# Patient Record
Sex: FEMALE | Race: ASIAN | NOT HISPANIC OR LATINO | Employment: FULL TIME | URBAN - METROPOLITAN AREA
[De-identification: names, ages, dates, MRNs, and addresses within clinical notes are randomized per-mention and may not be internally consistent; named-entity substitution may affect disease eponyms.]

---

## 2018-02-02 ENCOUNTER — OFFICE VISIT (OUTPATIENT)
Dept: URGENT CARE | Facility: CLINIC | Age: 33
End: 2018-02-02

## 2018-02-02 VITALS
DIASTOLIC BLOOD PRESSURE: 90 MMHG | HEIGHT: 64 IN | HEART RATE: 91 BPM | RESPIRATION RATE: 16 BRPM | OXYGEN SATURATION: 99 % | SYSTOLIC BLOOD PRESSURE: 118 MMHG | TEMPERATURE: 98 F | BODY MASS INDEX: 26.46 KG/M2 | WEIGHT: 155 LBS

## 2018-02-02 DIAGNOSIS — T78.40XA ALLERGIC REACTION, INITIAL ENCOUNTER: Primary | ICD-10-CM

## 2018-02-02 PROCEDURE — 99203 OFFICE O/P NEW LOW 30 MIN: CPT | Mod: 25,S$GLB,, | Performed by: NURSE PRACTITIONER

## 2018-02-02 PROCEDURE — 96372 THER/PROPH/DIAG INJ SC/IM: CPT | Mod: S$GLB,,, | Performed by: FAMILY MEDICINE

## 2018-02-02 PROCEDURE — 3008F BODY MASS INDEX DOCD: CPT | Mod: S$GLB,,, | Performed by: NURSE PRACTITIONER

## 2018-02-02 PROCEDURE — 96372 THER/PROPH/DIAG INJ SC/IM: CPT | Mod: S$GLB,,, | Performed by: NURSE PRACTITIONER

## 2018-02-02 RX ORDER — DEXTROAMPHETAMINE SACCHARATE, AMPHETAMINE ASPARTATE MONOHYDRATE, DEXTROAMPHETAMINE SULFATE AND AMPHETAMINE SULFATE 3.75; 3.75; 3.75; 3.75 MG/1; MG/1; MG/1; MG/1
15 CAPSULE, EXTENDED RELEASE ORAL EVERY MORNING
COMMUNITY
End: 2022-05-25 | Stop reason: SDUPTHER

## 2018-02-02 RX ORDER — METHYLPREDNISOLONE 4 MG/1
4 TABLET ORAL DAILY
Qty: 21 TABLET | Refills: 0 | Status: SHIPPED | OUTPATIENT
Start: 2018-02-02 | End: 2018-02-23

## 2018-02-02 RX ORDER — HYDROXYZINE HYDROCHLORIDE 25 MG/1
25 TABLET, FILM COATED ORAL 4 TIMES DAILY PRN
Qty: 28 TABLET | Refills: 0 | Status: SHIPPED | OUTPATIENT
Start: 2018-02-02 | End: 2018-02-09

## 2018-02-02 RX ORDER — EPINEPHRINE 0.3 MG/.3ML
1 INJECTION SUBCUTANEOUS ONCE AS NEEDED
Qty: 2 EACH | Refills: 0 | Status: SHIPPED | OUTPATIENT
Start: 2018-02-02 | End: 2022-07-01

## 2018-02-02 RX ORDER — PREDNISONE 20 MG/1
60 TABLET ORAL ONCE
Qty: 3 TABLET | Refills: 0 | Status: CANCELLED | OUTPATIENT
Start: 2018-02-02 | End: 2018-02-02

## 2018-02-02 RX ORDER — EPINEPHRINE 0.1 MG/ML
0.3 INJECTION INTRAVENOUS
Status: COMPLETED | OUTPATIENT
Start: 2018-02-02 | End: 2018-02-02

## 2018-02-02 RX ORDER — PREDNISONE 20 MG/1
60 TABLET ORAL
Status: COMPLETED | OUTPATIENT
Start: 2018-02-02 | End: 2018-02-02

## 2018-02-02 RX ORDER — DIAZEPAM 10 MG/1
10 TABLET ORAL
COMMUNITY
End: 2022-05-25 | Stop reason: SDUPTHER

## 2018-02-02 RX ADMIN — PREDNISONE 60 MG: 20 TABLET ORAL at 12:02

## 2018-02-02 RX ADMIN — EPINEPHRINE 0.3 MG: 0.1 INJECTION INTRAVENOUS at 01:02

## 2018-02-02 NOTE — PROGRESS NOTES
"Subjective:       Patient ID: Eleanor Aguero is a 32 y.o. female.    Vitals:  height is 5' 4" (1.626 m) and weight is 70.3 kg (155 lb). Her temperature is 98.2 °F (36.8 °C). Her blood pressure is 118/90 (abnormal) and her pulse is 91. Her respiration is 16 and oxygen saturation is 99%.     Chief Complaint: Allergic Reaction (Tuesday)    Pt ate oysters and peanuts on Monday. Pt noticed hives on Tuesday morning so she started taking benadryl as needed. Pt took zyrtec at 10am this morning. Pt says she also must be allergic to zyrtec because her lips started swelling after taking the zyrtec about 15 minutes later. No SOB or throat swelling. Pt says the hives is worse on her thighs. The hives is all over her body. The hives started traveling up to her face this morning.       Allergic Reaction   This is a new problem. The current episode started 3 to 5 days ago. The problem has been gradually worsening since onset. The problem is moderate. Associated symptoms include itching and a rash.     Review of Systems   Constitution: Negative for chills and fever.   HENT: Negative for sore throat.    Respiratory: Negative for shortness of breath.    Skin: Positive for itching and rash.   Musculoskeletal: Negative for joint pain.       Objective:      Physical Exam   Constitutional: She is oriented to person, place, and time. She appears well-developed and well-nourished. She is cooperative.  Non-toxic appearance. She does not appear ill. No distress.       HENT:   Head: Normocephalic and atraumatic.   Right Ear: Hearing, tympanic membrane, external ear and ear canal normal.   Left Ear: Hearing, tympanic membrane, external ear and ear canal normal.   Nose: Nose normal. No mucosal edema, rhinorrhea or nasal deformity. No epistaxis. Right sinus exhibits no maxillary sinus tenderness and no frontal sinus tenderness. Left sinus exhibits no maxillary sinus tenderness and no frontal sinus tenderness.   Mouth/Throat: Uvula is midline, " oropharynx is clear and moist and mucous membranes are normal. No trismus in the jaw. Normal dentition. No uvula swelling. No posterior oropharyngeal erythema.       Eyes: Conjunctivae and lids are normal. Right eye exhibits no discharge. Left eye exhibits no discharge. No scleral icterus.   Sclera clear bilat   Neck: Trachea normal, normal range of motion, full passive range of motion without pain and phonation normal. Neck supple.   Cardiovascular: Normal rate, regular rhythm, normal heart sounds, intact distal pulses and normal pulses.    Pulmonary/Chest: Effort normal and breath sounds normal. No respiratory distress.   Abdominal: Soft. Normal appearance and bowel sounds are normal. She exhibits no distension, no pulsatile midline mass and no mass. There is no tenderness.   Musculoskeletal: Normal range of motion. She exhibits no edema or deformity.   Neurological: She is alert and oriented to person, place, and time. She exhibits normal muscle tone. Coordination normal.   Skin: Skin is warm, dry and intact. Rash noted. Rash is urticarial (hives noted to upper and lower exteremeties upper lip is also swollen.  She denies any  issues with throat swelling or itching, or SOB or Wheezing). She is not diaphoretic. No pallor.   Rash is not totally resolved, but is improving.  Patient is ready to be discharged.     Psychiatric: She has a normal mood and affect. Her speech is normal and behavior is normal. Judgment and thought content normal. Cognition and memory are normal.   Nursing note and vitals reviewed.      Assessment:       1. Allergic reaction, initial encounter        Plan:         Allergic reaction, initial encounter  -     ranitidine tablet 300 mg; Take 2 tablets (300 mg total) by mouth one time.  -     predniSONE tablet 60 mg; Take 3 tablets (60 mg total) by mouth one time.  -     Ambulatory referral to Allergy    Other orders  -     Cancel: ranitidine (ZANTAC) 150 MG tablet; Take 2 tablets (300 mg total)  by mouth once.  Dispense: 2 tablet; Refill: 1  -     Cancel: predniSONE (DELTASONE) 20 MG tablet; Take 3 tablets (60 mg total) by mouth once.  Dispense: 3 tablet; Refill: 0  -     EPINEPHrine 0.1 mg/mL injection 0.3 mg; Inject 3 mLs (0.3 mg total) into the skin one time.  -     EPINEPHrine (EPIPEN) 0.3 mg/0.3 mL AtIn; Inject 0.3 mLs (0.3 mg total) into the muscle once as needed (for severe allergic reaction).  Dispense: 2 each; Refill: 0  -     methylPREDNISolone (MEDROL) 4 MG Tab; Take 1 tablet (4 mg total) by mouth once daily. 24 mg (6 pills) PO on day 1, then decrease by 4mg/day x 5 days per dose pack instructions.  Dispense: 21 tablet; Refill: 0  -     hydrOXYzine HCl (ATARAX) 25 MG tablet; Take 1 tablet (25 mg total) by mouth 4 (four) times daily as needed for Itching.  Dispense: 28 tablet; Refill: 0

## 2018-02-02 NOTE — PATIENT INSTRUCTIONS
Local Allergic Reaction, Other  You are having an allergic reaction. Almost anything can cause one. Different people are allergic to different things. It is usually something that you ate or swallowed, came into contact with by getting or putting it on your skin or clothes, or something you breathed in the air. This can be very annoying and sometimes scary.  Symptoms of an allergic reaction can include:  · Rash, hives, redness, welts, blisters  · Itching, burning, stinging, pain  · Dry, flaky, cracking, scaly skin  · Swelling of the face, lips or other parts of the body  Sometimes the cause of an allergic reaction may be obvious. To help identify your allergen, remember:  · When it started  · What you were doing at the time or just before that  · Any activities you were involved in  · Any new products or contacts  Here are some common causes, but remember almost anything can cause a reaction, and you may not even be aware that you came into contact with one of these things.  · Dust, mold, pollen  · Plants such as poison ivy and poison oak are common ones, but there are many others  · Animals  · Foods such as shrimp, shellfish, peanuts, milk products, gluten, eggs; also colorings, flavorings, additives  · Insect bites or stings such as bees, mosquitos, flees, ticks  · Medicines such as penicillin, sulfa drugs, amoxicillin, aspirin, ibuprofen; any medicine can cause a reaction  · Jewelry such as nickel, gold  (new, or something youve worn for a while including zippers, and  buttons)  · Latex such as in gloves, clothes, toys, balloons, or some tapes (some people allergic to latex may also have problems with foods like bananas, avocados, kiwi, papaya, or chestnuts)  · Lotions, perfumes, cosmetics, soaps, shampoos, skincare products, nail products  · Chemicals or dyes in clothing, linen, , hair dyes, soaps, iodine  Home care    The goal of our treatment is to help relieve the symptoms, and get you feeling  better. The rash will usually fade over several days, but can sometimes last a couple of weeks. Over the next couple of days, there may be times when it is gets a little worse, and then better again. Here are some things to do:  · If you know what you are allergic to, avoid it because future reactions could be worse than this one.  · Avoid tight clothing and anything that heats up your skin (hot showers/baths, direct sunlight) since heat will make itching worse.  · An ice pack will relieve local areas of intense itching and redness. Dont put the ice directly on the skin, because it can damage the skin. You can also ice put it in a plastic bag. Wrap it in something like a towel, luis shirt, or cloth.  · Oral Benadryl (diphenhydramine) is an antihistamine available at drug and grocery stores. Unless a prescription antihistamine was given, Benadryl may be used to reduce itching if large areas of the skin are involved. It may make you sleepy, so be careful using it in the daytime or when going to school, working, or driving. [NOTE: Do not use Benadryl if you have glaucoma or if you are a man with trouble urinating due to an enlarged prostate.] There are antihistamines that causes less drowsiness and is a good alternatives for daytime use. Ask your pharmacist for suggestions.  · Do not use Benadryl cream on your skin, because in some people it can cause a further reaction, and make you allergic to Benadryl.  · Try not to scratch. This can tear the skin and cause an infection.  · Using heat-steam to clean your home, using high-efficiency particulate (HEPA) vacuums and filters, avoiding food and pet triggers, exterminating cockroaches, and frequent house cleaning are a few of the strategies used to decrease allergic reactions.  Follow-up care  Follow up with your healthcare provider, or as advised if your symptoms do not continue to improve or get worse.  Call 911  Call 911 if any of these occur:  · Trouble breathing or  swallowing, wheezing  · New or worsening swelling in the mouth, throat, or tongue  · Hoarse voice or trouble speaking  · Confused   · Very drowsy or trouble awakening  · Fainting or loss of consciousness  · Rapid heart rate  · Low blood pressure  · Feeling of doom  · Nausea, vomiting, abdominal pain, diarrhea  · Vomiting blood, or large amounts of blood in stool  · Seizure  When to seek medical advice  Call your healthcare provider right away if any of the following occur:  · Spreading areas of itching, redness or swelling  · New or worse swelling in the face, eyelids, or  lips  · Dizziness, weakness  · Signs of infection:  ¨ Spreading redness  ¨ Increased pain or swelling  ¨ Fever of 100.4ºF (38ºC) or higher, or as directed by your healthcare provider  ¨ Colored fluid draining from the inflamed areas  Date Last Reviewed: 7/30/2015  © 6576-2393 [employer], [facility]. [address] [place] [address]. All rights reserved. This information is not intended as a substitute for professional medical care. Always follow your healthcare professional's instructions.

## 2018-02-05 ENCOUNTER — CLINICAL SUPPORT (OUTPATIENT)
Dept: URGENT CARE | Facility: CLINIC | Age: 33
End: 2018-02-05

## 2018-02-05 ENCOUNTER — TELEPHONE (OUTPATIENT)
Dept: URGENT CARE | Facility: CLINIC | Age: 33
End: 2018-02-05

## 2018-02-05 VITALS
OXYGEN SATURATION: 99 % | TEMPERATURE: 98 F | HEIGHT: 64 IN | WEIGHT: 155 LBS | BODY MASS INDEX: 26.46 KG/M2 | RESPIRATION RATE: 16 BRPM | DIASTOLIC BLOOD PRESSURE: 78 MMHG | SYSTOLIC BLOOD PRESSURE: 102 MMHG | HEART RATE: 75 BPM

## 2018-02-05 DIAGNOSIS — T78.40XD ALLERGIC REACTION, SUBSEQUENT ENCOUNTER: Primary | ICD-10-CM

## 2018-02-05 PROCEDURE — 99213 OFFICE O/P EST LOW 20 MIN: CPT | Mod: S$GLB,,, | Performed by: NURSE PRACTITIONER

## 2018-02-05 RX ORDER — PREDNISONE 20 MG/1
20 TABLET ORAL 2 TIMES DAILY
Qty: 10 TABLET | Refills: 0 | Status: SHIPPED | OUTPATIENT
Start: 2018-02-05 | End: 2018-02-10

## 2018-02-05 RX ORDER — HYDROXYZINE PAMOATE 25 MG/1
25 CAPSULE ORAL
Status: COMPLETED | OUTPATIENT
Start: 2018-02-05 | End: 2018-02-05

## 2018-02-05 RX ORDER — PREDNISONE 20 MG/1
60 TABLET ORAL
Status: COMPLETED | OUTPATIENT
Start: 2018-02-05 | End: 2018-02-05

## 2018-02-05 RX ORDER — HYDROXYZINE PAMOATE 25 MG/1
25 CAPSULE ORAL EVERY 8 HOURS PRN
Qty: 21 CAPSULE | Refills: 0 | Status: SHIPPED | OUTPATIENT
Start: 2018-02-05 | End: 2022-07-01

## 2018-02-05 RX ADMIN — PREDNISONE 60 MG: 20 TABLET ORAL at 12:02

## 2018-02-05 RX ADMIN — HYDROXYZINE PAMOATE 25 MG: 25 CAPSULE ORAL at 12:02

## 2018-02-05 NOTE — LETTER
February 5, 2018      [facility] [place]  [address] [address]  Phone: [phone]  Fax: [phone]       Patient: Eleanor Aguero   YOB: 1985  Date of Visit: 02/05/2018    To Whom It May Concern:    Gabriela Aguero  was at [facility] on 02/05/2018. She may return to work/school on 2/8/18 with no restrictions. If you have any questions or concerns, or if I can be of further assistance, please do not hesitate to contact me.    Sincerely,    GIA Watts

## 2018-02-05 NOTE — PROGRESS NOTES
"Subjective:       Patient ID: Eleanor Aguero is a 32 y.o. female.    Vitals:  height is 5' 4" (1.626 m) and weight is 70.3 kg (155 lb). Her temperature is 98 °F (36.7 °C). Her blood pressure is 102/78 and her pulse is 75. Her respiration is 16 and oxygen saturation is 99%.     Chief Complaint: Rash    Pt in for allergic rxn that she came in for a week ago. The rash did go away and then started coming back about an hour ago. Denies trouble breathing. Denies throat swelling. Pt lips and tongue do not feel tingly. Pt has only been taking the steroid that she was prescribed, denies taking anything else. The rash is everywhere, all over her arms and legs.       Rash   This is a recurrent problem. The current episode started in the past 7 days. The problem has been waxing and waning since onset. Pertinent negatives include no congestion, fever, joint pain, shortness of breath or sore throat.     Review of Systems   Constitution: Negative for chills and fever.   HENT: Negative for congestion and sore throat.    Respiratory: Negative for shortness of breath.    Skin: Positive for color change, itching and rash.   Musculoskeletal: Negative for joint pain.       Objective:      Physical Exam   Constitutional: She is oriented to person, place, and time. She appears well-developed and well-nourished.   HENT:   Head: Normocephalic and atraumatic. Head is without abrasion, without contusion and without laceration.   Right Ear: External ear normal.   Left Ear: External ear normal.   Nose: Nose normal.   Mouth/Throat: Oropharynx is clear and moist.   Eyes: Conjunctivae, EOM and lids are normal. Pupils are equal, round, and reactive to light.   Neck: Trachea normal, full passive range of motion without pain and phonation normal. Neck supple.   Cardiovascular: Normal rate, regular rhythm and normal heart sounds.    Pulmonary/Chest: Effort normal and breath sounds normal. No stridor. No respiratory distress.   Musculoskeletal: " Normal range of motion.   Neurological: She is alert and oriented to person, place, and time.   Skin: Skin is warm, dry and intact. Capillary refill takes less than 2 seconds. No abrasion, no bruising, no burn, no ecchymosis, no laceration, no lesion and no rash noted. No erythema.   Urticarial rash noted to bue and ble, pruritic, no complaints of sob or wheezing, chest clear   Psychiatric: She has a normal mood and affect. Her speech is normal and behavior is normal. Judgment and thought content normal. Cognition and memory are normal.   Nursing note and vitals reviewed.      Assessment:       1. Allergic reaction, subsequent encounter        Plan:         Allergic reaction, subsequent encounter    Other orders  -     predniSONE tablet 60 mg; Take 3 tablets (60 mg total) by mouth one time.  -     hydrOXYzine pamoate capsule 25 mg; Take 1 capsule (25 mg total) by mouth one time.  -     ranitidine (ZANTAC) 150 MG tablet; Take 1 tablet (150 mg total) by mouth 2 (two) times daily.  Dispense: 60 tablet; Refill: 1        pt was improved until this am, when the hives returned, she was taking medrol dose pack, but had not taken any antihistamine since Saturday. Will restart antihistamines and increase steroids and follow up with allergist or report to ED if symptoms worsen. No airway involvement or sob.

## 2018-02-05 NOTE — PATIENT INSTRUCTIONS
General Allergic Reactions  An allergic reaction is a set of symptoms caused by an allergen. An allergen is something that causes a persons immune system to react. When a person comes in contact with an allergen, it causes the body to release chemicals. These include the chemical histamine. Histamine causes swelling and itching. It may affect the entire body. This is called a general allergic reaction. Often symptoms affect only 1 part of the body. This is called a local allergic reaction.  You are having an allergic reaction. Almost anything can cause one. Different people are allergic to different things. It is usually something that you ate or swallowed, came into contact with by getting or putting it on your skin or clothes, or something you breathed in the air. This can be very annoying and sometimes scary.  Most of us think of allergic reactions when we have a rash or itchy skin. Symptoms can include:  · Itching of the eyes, nose, and roof of the mouth  · Runny or stuffy nose  · Watery eyes   · Sneezing or coughing   · A blocked feeling in the ear  · Red, itchy rash called hives  · Red and purple spots  · Rash, redness, welts, blisters  · Itching, burning, stinging, pain  · Dry, flaky, cracking, scaly skin  Severe symptoms include:  · Swelling of the face, lips, or other parts of the body  · Hoarse voice  · Trouble swallowing, feeling like your throat is closing  · Trouble breathing, wheezing  · Nausea, vomiting, diarrhea, stomach cramps  · Feeling faint or lightheaded, rapid heart rate  Sometimes the cause may be obvious. But there are so many things that can cause a reaction that you may not be able to figure out. The most important things to help find your allergen are:  · Remembering when it started  · What you were doing at the time or just before that  · Any activities you were involved in  · Any new products or contacts  Below are some common causes. But remember that almost anything can cause a  reaction. You may not even be aware that you came into contact with one of these things:  · Dust, mold, pollen  · Plants (common ones are poison ivy and poison oak, but there are many others)   · Animals  · Foods such as shrimp, shellfish, peanuts, milk products, gluten, and eggs. Also food colorings, flavorings, and additives.  · Insect bites or stings such as bees, mosquitos, fleas, ticks  · Medicines such as penicillin, sulfa medicines, amoxicillin, aspirin, and ibuprofen. But any medicine can cause a reaction.  · Jewelry such as nickel or gold. This can be new, or something youve worn for a while, including zippers and buttons.  · Latex such as in gloves, clothes, toys, balloons, or some tapes. Some people allergic to latex may also have problems with foods like bananas, avocados, kiwi, papaya, or chestnuts.  · Lotions, perfumes, cosmetics, soaps, shampoos, skincare products, nail products  · Chemicals or dyes in clothing, linen, , hair dyes, soaps, iodine  Many viruses and common colds can cause a rash that is not an allergic reaction. Sometimes it is hard to tell the difference between allergies, sensitivity, or an intolerance to something. This is especially true with food. Many things can cause diarrhea, vomiting, stomach cramps, and skin irritation.  Home care    The goal of treatment is to help relieve the symptoms and get you feeling better. The rash will usually fade over several days. But it can sometimes last a couple of weeks. Over the next couple of days, there may be times when it is gets a little worse, and then better again. Here are some things to do:  · If you know what you are allergic to, stay away from it. Future reactions could be worse than this one.  · Avoid tight clothing and anything that heats up your skin (hot showers or baths, direct sunlight). Heat will make itching worse.  · An ice pack will relieve local areas of intense itching and redness. To make an ice pack, put ice  cubes in a plastic bag that seals at the top. Wrap it in a thin, clean towel. Dont put the ice directly on the skin because it can damage the skin.  · Oral diphenhydramine is an over-the-counter antihistamine sold at pharmacy and grocery stores. Unless a prescription antihistamine was given, diphenhydramine may be used to reduce itching if large areas of the skin are involved. It may make you sleepy. So be careful using it in the daytime or when going to school, working, or driving. Note: Dont use diphenhydramine if you have glaucoma or if you are a man with trouble urinating due to an enlarged prostate. There are other antihistamines that wont make you so sleepy. These are good choices for daytime use. Ask your pharmacist for suggestions.  · Dont use diphenhydramine cream on your skin. It can cause a further reaction in some people.  · To help prevent an infection, don't scratch the affected area. Scratching may worsen the reaction and damage your skin. It can also lead to an infection. Always check the affected for signs of an infection.  · Call your healthcare provider and ask what you can use to help decrease the itching.  · To decrease allergic reactions, try the following:    · Use heat-steam to clean your home  · Use high-efficiency particulate (HEPA) vacuums and filters  · Stay away from food and pet triggers  · Kill any cockroaches  · Clean your house often  Follow-up care  Follow up with your healthcare provider, or as advised. If you had a severe reaction today, or if you have had several mild to medium allergic reactions in the past, ask your provider about allergy testing. This can help you find out what you are allergic to. If your reaction included dizziness, fainting, or trouble breathing or swallowing, ask your provider about carrying auto-injectable epinephrine.  Call 911  Call 911 if any of these occur:  · Trouble breathing or swallowing, wheezing  · Cool, moist, pale skin  · Shortness of  breath  · Hoarse voice or trouble speaking  · Confused   · Very drowsy or trouble awakening  · Fainting or loss of consciousness  · Rapid heart rate  · Feeling of dizziness or weakness or a sudden drop in blood pressure  · Feeling of doom  · Feeling lightheaded  · Severe nausea or vomiting, or diarrhea  · Seizure  · Swelling in the face, eyelids, lips, mouth, throat or tongue  · Drooling  When to seek medical advice  Call your healthcare provider right away if any of these occur:  · Spreading areas of itching, redness or swelling  · Nausea or stomach cramps or abdominal pain  · Continuing or recurring symptoms  · Spreading areas of redness, swelling, or itching  · Signs of infection at the affected site:  ¨ Spreading redness  ¨ Increased pain or swelling  ¨ Fluid or colored drainage from the site  ¨ Fever of 100.4°F (38°C) or above lasting for 24 to 48 hours, or as directed by your provider  Date Last Reviewed: 3/1/2017  © 6287-5158 [employer]. [address] [place] [address], [place] [address]. All rights reserved. This information is not intended as a substitute for professional medical care. Always follow your healthcare professional's instructions.      YOU HAVE AN APPT WITH Dr. MOLINA AT [facility] AT 9:15 Thursday Am. IF YOUR SYMPTOMS WORSEN PRIOR TO THAT, REPORT TO [place] OR [facility].  [address] [facility], [place] PHONE NUMBER [phone]

## 2019-07-19 ENCOUNTER — HISTORICAL (OUTPATIENT)
Dept: ADMINISTRATIVE | Facility: HOSPITAL | Age: 34
End: 2019-07-19

## 2019-07-19 LAB
ALBUMIN SERPL-MCNC: 4.7 G/DL (ref 3.5–5.5)
ALBUMIN/GLOB SERPL: 1.6 {RATIO} (ref 1.2–2.2)
ALP SERPL-CCNC: 66 IU/L (ref 39–117)
ALT SERPL-CCNC: 21 IU/L (ref 0–32)
AST SERPL-CCNC: 20 IU/L (ref 0–40)
BASOPHILS # BLD AUTO: 0 X10E3/UL (ref 0–0.2)
BASOPHILS NFR BLD AUTO: 0 %
BILIRUB SERPL-MCNC: 0.2 MG/DL (ref 0–1.2)
BUN SERPL-MCNC: 15 MG/DL (ref 6–20)
CALCIUM SERPL-MCNC: 9.6 MG/DL (ref 8.7–10.2)
CHLORIDE SERPL-SCNC: 100 MMOL/L (ref 96–106)
CHOLEST SERPL-MCNC: 181 MG/DL (ref 100–199)
CHOLEST/HDLC SERPL: 4.2 RATIO (ref 0–4.4)
CO2 SERPL-SCNC: 23 MMOL/L (ref 20–29)
CREAT SERPL-MCNC: 0.71 MG/DL (ref 0.57–1)
CREAT/UREA NIT SERPL: 21 (ref 9–23)
DEPRECATED CALCIDIOL+CALCIFEROL SERPL-MC: 17.2 NG/ML (ref 30–100)
EOSINOPHIL # BLD AUTO: 0.1 X10E3/UL (ref 0–0.4)
EOSINOPHIL NFR BLD AUTO: 2 %
ERYTHROCYTE [DISTWIDTH] IN BLOOD BY AUTOMATED COUNT: 13.2 % (ref 12.3–15.4)
GLOBULIN SER-MCNC: 2.9 G/DL (ref 1.5–4.5)
GLUCOSE SERPL-MCNC: 118 MG/DL (ref 65–99)
HBA1C MFR BLD: 5.9 % (ref 4.8–5.6)
HCT VFR BLD AUTO: 44.5 % (ref 34–46.6)
HDLC SERPL-MCNC: 43 MG/DL
HGB BLD-MCNC: 14.2 G/DL (ref 11.1–15.9)
LDLC SERPL CALC-MCNC: 70 MG/DL (ref 0–99)
LYMPHOCYTES # BLD AUTO: 2.5 X10E3/UL (ref 0.7–3.1)
LYMPHOCYTES NFR BLD AUTO: 33 %
MCH RBC QN AUTO: 31.6 PG (ref 26.6–33)
MCHC RBC AUTO-ENTMCNC: 31.9 G/DL (ref 31.5–35.7)
MCV RBC AUTO: 99 FL (ref 79–97)
MONOCYTES # BLD AUTO: 0.4 X10E3/UL (ref 0.1–0.9)
MONOCYTES NFR BLD AUTO: 5 %
NEUTROPHILS # BLD AUTO: 4.5 X10E3/UL (ref 1.4–7)
NEUTROPHILS NFR BLD AUTO: 60 %
PLATELET # BLD AUTO: 231 X10E3/UL (ref 150–450)
POTASSIUM SERPL-SCNC: 4.3 MMOL/L (ref 3.5–5.2)
PROT SERPL-MCNC: 7.6 G/DL (ref 6–8.5)
RBC # BLD AUTO: 4.5 X10(6)/MCL (ref 3.77–5.28)
SODIUM SERPL-SCNC: 139 MMOL/L (ref 134–144)
TRIGL SERPL-MCNC: 341 MG/DL (ref 0–149)
TSH SERPL-ACNC: 3.98 MIU/ML (ref 0.45–4.5)
VLDLC SERPL CALC-MCNC: 68 MG/DL (ref 5–40)
WBC # SPEC AUTO: 7.5 X10E3/UL (ref 3.4–10.8)

## 2019-08-05 LAB
PAP RECOMMENDATION EXT: NORMAL
PAP SMEAR: NORMAL

## 2020-08-17 ENCOUNTER — HISTORICAL (OUTPATIENT)
Dept: ADMINISTRATIVE | Facility: HOSPITAL | Age: 35
End: 2020-08-17

## 2020-08-17 LAB
ALBUMIN SERPL-MCNC: 4.7 G/DL (ref 3.8–4.8)
ALBUMIN/GLOB SERPL: 2.1 {RATIO} (ref 1.2–2.2)
ALP SERPL-CCNC: 58 IU/L (ref 39–117)
ALT SERPL-CCNC: 28 IU/L (ref 0–32)
AST SERPL-CCNC: 19 IU/L (ref 0–40)
BASOPHILS # BLD AUTO: 0 X10E3/UL (ref 0–0.2)
BASOPHILS NFR BLD AUTO: 0 %
BILIRUB SERPL-MCNC: 0.2 MG/DL (ref 0–1.2)
BUN SERPL-MCNC: 11 MG/DL (ref 6–20)
CALCIUM SERPL-MCNC: 9.5 MG/DL (ref 8.7–10.2)
CHLORIDE SERPL-SCNC: 102 MMOL/L (ref 96–106)
CHOLEST SERPL-MCNC: 171 MG/DL (ref 100–199)
CHOLEST/HDLC SERPL: 4.5 RATIO (ref 0–4.4)
CO2 SERPL-SCNC: 22 MMOL/L (ref 20–29)
CREAT SERPL-MCNC: 0.86 MG/DL (ref 0.57–1)
CREAT/UREA NIT SERPL: 13 (ref 9–23)
DEPRECATED CALCIDIOL+CALCIFEROL SERPL-MC: 16.9 NG/ML (ref 30–100)
EOSINOPHIL # BLD AUTO: 0.1 X10E3/UL (ref 0–0.4)
EOSINOPHIL NFR BLD AUTO: 2 %
ERYTHROCYTE [DISTWIDTH] IN BLOOD BY AUTOMATED COUNT: 12.7 % (ref 11.7–15.4)
GLOBULIN SER-MCNC: 2.2 G/DL (ref 1.5–4.5)
GLUCOSE SERPL-MCNC: 168 MG/DL (ref 65–99)
HBA1C MFR BLD: 6.4 % (ref 4.8–5.6)
HCT VFR BLD AUTO: 39.5 % (ref 34–46.6)
HDLC SERPL-MCNC: 38 MG/DL
HGB BLD-MCNC: 12.5 G/DL (ref 11.1–15.9)
LDLC SERPL CALC-MCNC: 89 MG/DL (ref 0–99)
LYMPHOCYTES # BLD AUTO: 2.5 X10E3/UL (ref 0.7–3.1)
LYMPHOCYTES NFR BLD AUTO: 35 %
MCH RBC QN AUTO: 30.6 PG (ref 26.6–33)
MCHC RBC AUTO-ENTMCNC: 31.6 G/DL (ref 31.5–35.7)
MCV RBC AUTO: 97 FL (ref 79–97)
MONOCYTES # BLD AUTO: 0.4 X10E3/UL (ref 0.1–0.9)
MONOCYTES NFR BLD AUTO: 5 %
NEUTROPHILS # BLD AUTO: 4.1 X10E3/UL (ref 1.4–7)
NEUTROPHILS NFR BLD AUTO: 58 %
PLATELET # BLD AUTO: 254 X10E3/UL (ref 150–450)
POTASSIUM SERPL-SCNC: 4 MMOL/L (ref 3.5–5.2)
PROT SERPL-MCNC: 6.9 G/DL (ref 6–8.5)
RBC # BLD AUTO: 4.08 X10(6)/MCL (ref 3.77–5.28)
SODIUM SERPL-SCNC: 141 MMOL/L (ref 134–144)
TRIGL SERPL-MCNC: 220 MG/DL (ref 0–149)
TSH SERPL-ACNC: 2.43 MIU/ML (ref 0.45–4.5)
VLDLC SERPL CALC-MCNC: 44 MG/DL (ref 5–40)
WBC # SPEC AUTO: 7.1 X10E3/UL (ref 3.4–10.8)

## 2022-03-11 ENCOUNTER — HISTORICAL (OUTPATIENT)
Dept: ADMINISTRATIVE | Facility: HOSPITAL | Age: 37
End: 2022-03-11

## 2022-03-11 LAB
ABS NEUT (OLG): 3.68 (ref 2.1–9.2)
ALBUMIN SERPL-MCNC: 3.9 G/DL (ref 3.5–5)
ALBUMIN/GLOB SERPL: 1.1 {RATIO} (ref 1.1–2)
ALP SERPL-CCNC: 54 U/L (ref 40–150)
ALT SERPL-CCNC: 30 U/L (ref 0–55)
APPEARANCE, UA: CLEAR
AST SERPL-CCNC: 20 U/L (ref 5–34)
BACTERIA SPEC CULT: NORMAL
BASOPHILS # BLD AUTO: 0 10*3/UL (ref 0–0.2)
BASOPHILS NFR BLD AUTO: 1 %
BILIRUB SERPL-MCNC: 0.3 MG/DL
BILIRUB UR QL STRIP: NEGATIVE
BILIRUBIN DIRECT+TOT PNL SERPL-MCNC: 0.1 (ref 0–0.5)
BILIRUBIN DIRECT+TOT PNL SERPL-MCNC: 0.2 (ref 0–0.8)
BUDDING YEAST: NORMAL
BUN SERPL-MCNC: 13.6 MG/DL (ref 7–18.7)
CALCIUM SERPL-MCNC: 9.1 MG/DL (ref 8.7–10.5)
CASTS, UA: NORMAL
CHLORIDE SERPL-SCNC: 104 MMOL/L (ref 98–107)
CHOLEST SERPL-MCNC: 203 MG/DL
CHOLEST/HDLC SERPL: 4 {RATIO} (ref 0–5)
CO2 SERPL-SCNC: 25 MMOL/L (ref 22–29)
COLOR UR: YELLOW
CREAT SERPL-MCNC: 0.73 MG/DL (ref 0.55–1.02)
CRYSTALS: NORMAL
DEPRECATED CALCIDIOL+CALCIFEROL SERPL-MC: 10.5 NG/ML (ref 30–80)
EOSINOPHIL # BLD AUTO: 0.1 10*3/UL (ref 0–0.9)
EOSINOPHIL NFR BLD AUTO: 2 %
ERYTHROCYTE [DISTWIDTH] IN BLOOD BY AUTOMATED COUNT: 13 % (ref 11.5–17)
EST. AVERAGE GLUCOSE BLD GHB EST-MCNC: 122.6 MG/DL
GLOBULIN SER-MCNC: 3.5 G/DL (ref 2.4–3.5)
GLUCOSE (UA): NEGATIVE
GLUCOSE SERPL-MCNC: 115 MG/DL (ref 74–100)
HBA1C MFR BLD: 5.9 %
HCT VFR BLD AUTO: 41.4 % (ref 37–47)
HDLC SERPL-MCNC: 51 MG/DL (ref 35–60)
HEMOLYSIS INTERF INDEX SERPL-ACNC: 2
HGB BLD-MCNC: 13.4 G/DL (ref 12–16)
HGB UR QL STRIP: NORMAL
ICTERIC INTERF INDEX SERPL-ACNC: 1
KETONES UR QL STRIP: NEGATIVE
LDLC SERPL CALC-MCNC: 80 MG/DL (ref 50–140)
LEUKOCYTE ESTERASE UR QL STRIP: NORMAL
LIPEMIC INTERF INDEX SERPL-ACNC: 13
LYMPHOCYTES # BLD AUTO: 2.3 10*3/UL (ref 0.6–4.6)
LYMPHOCYTES NFR BLD AUTO: 36 %
MANUAL DIFF? (OHS): NO
MCH RBC QN AUTO: 31.4 PG (ref 27–31)
MCHC RBC AUTO-ENTMCNC: 32.4 G/DL (ref 33–36)
MCV RBC AUTO: 97 FL (ref 80–94)
MONOCYTES # BLD AUTO: 0.4 10*3/UL (ref 0.1–1.3)
MONOCYTES NFR BLD AUTO: 6 %
NEUTROPHILS # BLD AUTO: 3.68 10*3/UL (ref 2.1–9.2)
NEUTROPHILS NFR BLD AUTO: 56 %
NITRITE UR QL STRIP: NEGATIVE
PH UR STRIP: 6 [PH] (ref 5–9)
PLATELET # BLD AUTO: 249 10*3/UL (ref 130–400)
PMV BLD AUTO: 9.1 FL (ref 9.4–12.4)
POTASSIUM SERPL-SCNC: 4.2 MMOL/L (ref 3.5–5.1)
PROT SERPL-MCNC: 7.4 G/DL (ref 6.4–8.3)
PROT UR QL STRIP: NEGATIVE
RBC # BLD AUTO: 4.27 10*6/UL (ref 4.2–5.4)
RBC #/AREA URNS HPF: NORMAL /[HPF] (ref 0–2)
SMALL ROUND CELLS, UA: NORMAL
SODIUM SERPL-SCNC: 139 MMOL/L (ref 136–145)
SP GR UR STRIP: 1.02 (ref 1–1.03)
SPERM URNS QL MICRO: NORMAL
SQUAMOUS EPITHELIAL, UA: 8 (ref 0–4)
TRIGL SERPL-MCNC: 360 MG/DL (ref 37–140)
TSH SERPL-ACNC: 3.14 M[IU]/L (ref 0.35–4.94)
UROBILINOGEN UR STRIP-ACNC: 0.2
VLDLC SERPL CALC-MCNC: 72 MG/DL
WBC # SPEC AUTO: 6.6 10*3/UL (ref 4.5–11.5)
WBC #/AREA URNS HPF: NORMAL /[HPF] (ref 0–2)

## 2022-04-09 ENCOUNTER — HISTORICAL (OUTPATIENT)
Dept: ADMINISTRATIVE | Facility: HOSPITAL | Age: 37
End: 2022-04-09
Payer: COMMERCIAL

## 2022-04-27 VITALS
WEIGHT: 165.38 LBS | DIASTOLIC BLOOD PRESSURE: 72 MMHG | BODY MASS INDEX: 28.24 KG/M2 | OXYGEN SATURATION: 98 % | SYSTOLIC BLOOD PRESSURE: 120 MMHG | HEIGHT: 64 IN

## 2022-05-25 RX ORDER — VALACYCLOVIR HYDROCHLORIDE 1 G/1
1000 TABLET, FILM COATED ORAL 2 TIMES DAILY
Qty: 2 TABLET | Refills: 3 | Status: SHIPPED | OUTPATIENT
Start: 2022-05-25 | End: 2022-11-22 | Stop reason: SDUPTHER

## 2022-05-25 RX ORDER — DEXTROAMPHETAMINE SACCHARATE, AMPHETAMINE ASPARTATE MONOHYDRATE, DEXTROAMPHETAMINE SULFATE AND AMPHETAMINE SULFATE 3.75; 3.75; 3.75; 3.75 MG/1; MG/1; MG/1; MG/1
15 CAPSULE, EXTENDED RELEASE ORAL EVERY MORNING
Qty: 30 CAPSULE | Refills: 0 | Status: SHIPPED | OUTPATIENT
Start: 2022-05-25 | End: 2022-07-01

## 2022-05-25 RX ORDER — VALACYCLOVIR HYDROCHLORIDE 1 G/1
1000 TABLET, FILM COATED ORAL 2 TIMES DAILY
COMMUNITY
End: 2022-05-25 | Stop reason: SDUPTHER

## 2022-05-25 RX ORDER — DIAZEPAM 10 MG/1
10 TABLET ORAL
Qty: 30 TABLET | Refills: 0 | Status: SHIPPED | OUTPATIENT
Start: 2022-05-25 | End: 2022-11-22 | Stop reason: SDUPTHER

## 2022-07-05 ENCOUNTER — TELEPHONE (OUTPATIENT)
Dept: FAMILY MEDICINE | Facility: CLINIC | Age: 37
End: 2022-07-05

## 2022-11-14 ENCOUNTER — TELEPHONE (OUTPATIENT)
Dept: PRIMARY CARE CLINIC | Facility: CLINIC | Age: 37
End: 2022-11-14

## 2022-11-14 NOTE — TELEPHONE ENCOUNTER
----- Message from Mohan Curiel sent at 11/14/2022 11:02 AM CST -----  .Type:  Needs Medical Advice    Who Called: pt  Would the patient rather a call back or a response via [facility]? Call back  Best Call Back Number: [phone]  Additional Information: pt stated stated the ophthalmologist stated that pt needed to contacted pcp Dr. Reeves to see about documentation stating that pt window tent that pt can have on her car due to her stigmatism. Pt is calling to get clarification on this

## 2022-11-22 ENCOUNTER — OFFICE VISIT (OUTPATIENT)
Dept: FAMILY MEDICINE | Facility: CLINIC | Age: 37
End: 2022-11-22

## 2022-11-22 VITALS
OXYGEN SATURATION: 98 % | DIASTOLIC BLOOD PRESSURE: 82 MMHG | HEART RATE: 85 BPM | RESPIRATION RATE: 17 BRPM | SYSTOLIC BLOOD PRESSURE: 110 MMHG | WEIGHT: 184 LBS | TEMPERATURE: 98 F | BODY MASS INDEX: 31.41 KG/M2 | HEIGHT: 64 IN

## 2022-11-22 DIAGNOSIS — H52.209 ASTIGMATISM, UNSPECIFIED LATERALITY, UNSPECIFIED TYPE: Primary | ICD-10-CM

## 2022-11-22 DIAGNOSIS — F32.A ANXIETY AND DEPRESSION: ICD-10-CM

## 2022-11-22 DIAGNOSIS — F41.9 ANXIETY AND DEPRESSION: ICD-10-CM

## 2022-11-22 DIAGNOSIS — Z13.31 POSITIVE DEPRESSION SCREENING: ICD-10-CM

## 2022-11-22 DIAGNOSIS — B00.9 HSV-1 INFECTION: ICD-10-CM

## 2022-11-22 DIAGNOSIS — G47.00 INSOMNIA, UNSPECIFIED TYPE: ICD-10-CM

## 2022-11-22 PROCEDURE — 99214 PR OFFICE/OUTPT VISIT, EST, LEVL IV, 30-39 MIN: ICD-10-PCS | Mod: ,,, | Performed by: PHYSICIAN ASSISTANT

## 2022-11-22 PROCEDURE — 99214 OFFICE O/P EST MOD 30 MIN: CPT | Mod: ,,, | Performed by: PHYSICIAN ASSISTANT

## 2022-11-22 RX ORDER — ZOLPIDEM TARTRATE 5 MG/1
5 TABLET ORAL NIGHTLY PRN
Qty: 30 TABLET | Refills: 2 | Status: SHIPPED | OUTPATIENT
Start: 2022-11-22 | End: 2022-12-22

## 2022-11-22 RX ORDER — ESCITALOPRAM OXALATE 10 MG/1
10 TABLET ORAL DAILY
Qty: 90 TABLET | Refills: 0 | Status: SHIPPED | OUTPATIENT
Start: 2022-11-22 | End: 2023-02-20

## 2022-11-22 RX ORDER — VALACYCLOVIR HYDROCHLORIDE 1 G/1
1000 TABLET, FILM COATED ORAL 2 TIMES DAILY
Qty: 180 TABLET | Refills: 0 | Status: SHIPPED | OUTPATIENT
Start: 2022-11-22 | End: 2023-02-20

## 2022-11-22 RX ORDER — DIAZEPAM 10 MG/1
10 TABLET ORAL
Qty: 30 TABLET | Refills: 2 | Status: SHIPPED | OUTPATIENT
Start: 2022-11-22 | End: 2022-12-22

## 2022-11-22 NOTE — PROGRESS NOTES
SUBJECTIVE:     History of Present Illness      Chief Complaint: STIGMATISM (Discuss tint)    HPI:  Patient is a 37 y.o. year old female who presents for follow-up and to discuss windshield tint for astigmatism.  Patient has medical history of ADHD, anxiety, vitamin-D deficiency, and insomnia.    Patient reports history of astigmatism. Patient states when driving at night astigmatism causes a lot of glare especially at night time. Denies eye pain or acute visual changes. Patient wonders if a windshield tint would help with glare. She reports she asked optometrist who she saw recently and was told this needed to come from medical doctor.     Patient also complains of depression. She has had depression in the past. Currently she is in between [place] and [place] with work and family. Depressive symptoms have been present for the past 6 months. Patient currently denies suicidal thoughts or ideation (patient was asked multiple times). Denies ever having a plan. Currently denies thoughts of hurting herself and others. Denies hallucinations. Patient states while in [place] she does not have family support which is hard for her. She does not go to counseling. Patient is normally on Valium for anxiety and has been out of medication for some time. Patient states Valium helps her cope with anxiety and panic attacks. She also uses Ambien as needed for insomnia. Patient reports medications are working well without side effects.    Review of Systems:  A comprehensive review of systems was performed and was negative except as noted in HPI     Previous History      Review of patient's allergies indicates:   Allergen Reactions    Aspirin-caffeine      Other reaction(s): Rash    Hydrocodone Hives and Nausea Only    Hydrocodone-acetaminophen     Ibuprofen Hives and Nausea Only     Past Medical History:   Diagnosis Date    ADHD (attention deficit hyperactivity disorder)     Generalized anxiety disorder     Insomnia     Obesity,  "unspecified     Vitamin D deficiency      Current Outpatient Medications   Medication Instructions    albuterol sulfate (PROAIR RESPICLICK) 90 mcg/actuation inhaler 2 puffs, Inhalation, Daily    diazePAM (VALIUM) 10 mg, Oral, Every 24 hours as needed    EScitalopram oxalate (LEXAPRO) 10 mg, Oral, Daily    lisdexamfetamine (VYVANSE) 40 mg, Oral, Daily    valACYclovir (VALTREX) 1,000 mg, Oral, 2 times daily    zolpidem (AMBIEN) 5 mg, Oral, Nightly PRN     Past Surgical History:   Procedure Laterality Date    WISDOM TOOTH EXTRACTION       Family History   Problem Relation Age of Onset    Hypertension Mother     Heart disease Mother     Diabetes Father     Stroke Brother      Social History     Tobacco Use    Smoking status: Never     Passive exposure: Never    Smokeless tobacco: Never   Substance Use Topics    Alcohol use: Yes    Drug use: No      Health Maintenance      Health Maintenance   Topic Date Due    Hepatitis C Screening  Never done    TETANUS VACCINE  04/22/2029    Lipid Panel  Completed     OBJECTIVE:     Physical Exam      Vital Signs Reviewed   /82 (BP Location: Right arm, Patient Position: Sitting, BP Method: Large (Manual))   Pulse 85   Temp 98.3 °F (36.8 °C) (Oral)   Resp 17   Ht 5' 4" (1.626 m)   Wt 83.5 kg (184 lb)   LMP 10/22/2022 (Exact Date)   SpO2 98%   BMI 31.58 kg/m²     Physical Exam:  General: Alert, well nourished, no acute distress, non-toxic appearing.   Eyes: Anicteric sclera, without conjunctival injection, normal lids, no purulent drainage, EOMs grossly intact.   Neck: Supple, full ROM, no rigidity, no cervical adenopathy.   Cardio: Normal rate and rhythm without murmurs, gallops, or rubs.   Resp: Respirations even and unlabored, clear to auscultation bilaterally.   Skin: No rashes or open lesions noted.   Psych: Crying on exam.   MSK: No swelling. No abrasions or signs of trauma. Ambulating without assistance.   Neuro: CN1-12 intact grossly. No focal deficits noted. " Facial expressions even.      Labs     Results for orders placed or performed in visit on 03/11/22   Hemoglobin A1C   Result Value Ref Range    Hemoglobin A1c 5.9 <=7.0    Estimated Average Glucose 122.6    CBC Auto Differential   Result Value Ref Range    WBC 6.6 4.5 - 11.5    RBC 4.27 4.20 - 5.40    Hgb 13.4 12.0 - 16.0    Hct 41.4 37.0 - 47.0    MCV 97.0 80.0 - 94.0    MCH 31.4 27.0 - 31.0    MCHC 32.4 33.0 - 36.0    RDW 13.0 11.5 - 17.0    Platelet 249 130 - 400    MPV 9.1 9.4 - 12.4    Abs Neut 3.68 2.10 - 9.20    Manual Diff? No    Differential   Result Value Ref Range    Neut % 56     Lymph % 36     Mono % 6     Eos % 2     Basophil % 1     Lymph # 2.3 0.6 - 4.6    Neut # 3.68 2.10 - 9.20    Mono # 0.4 0.1 - 1.3    Eos # 0.1 0.0 - 0.9    Baso # 0.0 0.0 - 0.2   Lipid Panel   Result Value Ref Range    Cholesterol Total 203 <=200    HDL Cholesterol 51 35 - 60    Triglyceride 360 37 - 140    Very Low Density Lipoprotein 72     Cholesterol/HDL Ratio 4 0 - 5    LDL Cholesterol 80.00 50.00 - 140.00   Comprehensive Metabolic Panel   Result Value Ref Range    Sodium Level 139 136 - 145    Potassium Level 4.2 3.5 - 5.1    Chloride 104 98 - 107    Carbon Dioxide 25 22 - 29    Glucose Level 115 74 - 100    Blood Urea Nitrogen 13.6 7.0 - 18.7    Creatinine 0.73 0.55 - 1.02    Calcium Level Total 9.1 8.7 - 10.5    Albumin Level 3.9 3.5 - 5.0    Protein Total 7.4 6.4 - 8.3    Globulin 3.5 2.4 - 3.5    Albumin/Globulin Ratio 1.1 1.1 - 2.0    Alkaline Phosphatase 54 40 - 150    Bilirubin Total 0.3 <=1.5    Bilirubin Direct 0.1 0.0 - 0.5    Bilirubin Indirect 0.20 0.00 - 0.80    Aspartate Aminotransferase 20 5 - 34    Alanine Aminotransferase 30 0 - 55    Hemolysis 2     Icterus 1     Lipemia 13    GFR, Estimated   Result Value Ref Range    Estimated GFR- >60     Estimated GFR-Non African American >60    TSH   Result Value Ref Range    Thyroid Stimulating Hormone 3.1382 0.3500 - 4.9400   Vitamin D   Result  Value Ref Range    Vit D 25 OH 10.5 30.0 - 80.0   Urinalysis Complete   Result Value Ref Range    Color, UA Yellow Yellow    Appearance, UA Clear Clear    Specific Gravity,UA 1.025 1.000 - 1.032    pH, UA 6.0 5.0 - 9.0    Protein, UA Negative Negative    Glucose, UA Negative Negative    Ketones, UA Negative Negative    Bilirubin (UA) Negative Negative    Occult Blood UA 1+ Negative    Urobilinogen, UA 0.2     Nitrite, UA Negative Negative    Leukocytes, UA Trace Negative    WBC, UA NONE SEEN 0 - 2    RBC, UA NONE SEEN 0 - 2    Squam Epithel, UA 8 0 - 4    Bacteria 1+ None Seen    Yeast, UA NOT PRESENT     Sperm, UA NOT PRESENT     Crystals NOT PRESENT     Small Round Cells, UA NOT PRESENT     CASTS, UA NONE SEEN         Assessment.Plan      1. Astigmatism, unspecified laterality, unspecified type   Opthalmology referral placed for possible window tint prescription.      2. Anxiety and depression   Reports worsening anxiety and depression. Denies suicidal thoughts and ideation.   Start Lexapro 10 mg daily.   Continue Valium every 24 hours PRN for anxiety.   Discussed with patient if she has any suicidal thoughts or ideation she should report to ER immediately for further evaluation as this is a medical emergency. Patient expressed understanding.  Discussed with patient she can not stop Lexapro abruptly and if she needs to discontinue the medication she should call the office for further instruction.  Patient is in between [place] and [place] currently. Recommended she find a PCP in [place] as she does spend more time in [place].   Will send psych referral today for counseling.   Follow up in 4 weeks to discuss response to Lexapro and further plan of care.   LA- reviewed       3. HSV-1 infection   valACYclovir (VALTREX) 1000 MG tablet     4. Insomnia, unspecified type   Uses Ambien PRN for insomnia.   Denies side effects of medication.   Dicussed she should not take with Valium due to sedating effects.      5. Positive  depression screening   See number 2      Orders Placed This Encounter    Ambulatory referral/consult to Ophthalmology    Ambulatory referral/consult to Psychiatry    EScitalopram oxalate (LEXAPRO) 10 MG tablet    diazePAM (VALIUM) 10 MG Tab    zolpidem (AMBIEN) 5 MG Tab    valACYclovir (VALTREX) 1000 MG tablet      Medication List with Changes/Refills   New Medications    ESCITALOPRAM OXALATE (LEXAPRO) 10 MG TABLET    Take 1 tablet (10 mg total) by mouth once daily.   Current Medications    ALBUTEROL SULFATE (PROAIR RESPICLICK) 90 MCG/ACTUATION INHALER    Inhale 2 puffs into the lungs once daily.    LISDEXAMFETAMINE (VYVANSE) 40 MG CAP    Take 40 mg by mouth once daily.   Changed and/or Refilled Medications    Modified Medication Previous Medication    DIAZEPAM (VALIUM) 10 MG TAB diazePAM (VALIUM) 10 MG Tab       Take 1 tablet (10 mg total) by mouth every 24 hours as needed (Anxiety).    Take 1 tablet (10 mg total) by mouth every 24 hours as needed.    VALACYCLOVIR (VALTREX) 1000 MG TABLET valACYclovir (VALTREX) 1000 MG tablet       Take 1 tablet (1,000 mg total) by mouth 2 (two) times daily.    Take 1 tablet (1,000 mg total) by mouth 2 (two) times daily.    ZOLPIDEM (AMBIEN) 5 MG TAB zolpidem (AMBIEN) 5 MG Tab       Take 1 tablet (5 mg total) by mouth nightly as needed (Insomnia).    Take 5 mg by mouth nightly as needed.     Follow up in about 4 weeks (around 12/20/2022), or Telemed depression follow up, for Virtual Visit.    STARR WattsC    Future Appointments   Date Time Provider Department Center   12/21/2022  2:40 PM BUDDY Munoz [facility]    3/13/2023  2:00 PM [facility]          I have reviewed the positive depression score which warrants active treatment with psychotherapy and/or medications.

## 2022-12-02 ENCOUNTER — DOCUMENTATION ONLY (OUTPATIENT)
Dept: INTERNAL MEDICINE | Facility: CLINIC | Age: 37
End: 2022-12-02

## 2023-02-20 ENCOUNTER — TELEPHONE (OUTPATIENT)
Dept: FAMILY MEDICINE | Facility: CLINIC | Age: 38
End: 2023-02-20

## 2023-02-20 NOTE — TELEPHONE ENCOUNTER
----- Message from Mane Sandoval sent at 2/20/2023 10:26 AM CST -----  >Type:  Needs Medical Advice    Who Called: Eleanor  Symptoms (please be specific):    How long has patient had these symptoms:    Pharmacy name and phone #:    Would the patient rather a call back or a response via [facility]?   Best Call Back Number: [phone]  Additional Information: Patient would like a call back re: a letter she needs for her service dog she told me she needs an updated copy.

## 2023-02-23 ENCOUNTER — OFFICE VISIT (OUTPATIENT)
Dept: FAMILY MEDICINE | Facility: CLINIC | Age: 38
End: 2023-02-23

## 2023-02-23 VITALS — WEIGHT: 170 LBS | BODY MASS INDEX: 29.18 KG/M2

## 2023-02-23 DIAGNOSIS — F41.1 GENERALIZED ANXIETY DISORDER: Primary | ICD-10-CM

## 2023-02-23 PROCEDURE — 99212 PR OFFICE/OUTPT VISIT, EST, LEVL II, 10-19 MIN: ICD-10-PCS | Mod: 95,,, | Performed by: FAMILY MEDICINE

## 2023-02-23 PROCEDURE — 99212 OFFICE O/P EST SF 10 MIN: CPT | Mod: 95,,, | Performed by: FAMILY MEDICINE

## 2023-02-23 NOTE — LETTER
2023      [facility]  [address]  [address]  [place] [address]  Phone: [phone]       Patient: Eleanor Varela   YOB: 1985  Date of Visit: 2023    To Whom It May Concern:    This letter is regarding Ms. Eleanor Varela, : 1985. Ms. Varela has been diagnosed with severe anxiety and panic attacks. Her panic attacks are controlled with medication as well as the presence of her dog. Please allow her dog to serve as a service animal. This would allow Ms. Varela to experience relief of her anxiety by the presence of her dog and decrease her need for anxiety medication. If there are any other questions or concerns, please feel free to contact my office at [phone]. Thank you for your cooperation in this matter.    Sincerely,      Nyla Reeves M.D.

## 2023-02-23 NOTE — PROGRESS NOTES
Patient ID: [id number]     Chief Complaint: Follow-up        HPI:     This is a telemedicine note. Patient was treated using telemedicine, real time audio and video, according to [facility] protocols. INyla M.D. , conducted the visit from the [facility]. The patient participated in the visit at a non-[facility] location selected by the patient, identified below. I am licensed in the state where the patient stated they are located. The patient stated that they understood and accepted the privacy and security risks to their information at their location. This visit is not recorded.    Patient was located at the patient's home.     Eleanor Varela is a 37 y.o. female here today for a telemedicine visit.    - She has severe anxiety that is controlled with Valium p.r.n., no side effects, she has Rx at home, she also reports that her anxiety is improved with her dog and she is requesting a renewed letter for her dog to be a service animal.   - Patient is without any other complaints today.        ----------------------------  ADHD (attention deficit hyperactivity disorder)  Generalized anxiety disorder  Insomnia  Obesity, unspecified  Vitamin D deficiency     Past Surgical History:   Procedure Laterality Date    WISDOM TOOTH EXTRACTION         Review of patient's allergies indicates:   Allergen Reactions    Aspirin-caffeine      Other reaction(s): Rash    Hydrocodone Hives and Nausea Only    Hydrocodone-acetaminophen     Ibuprofen Hives and Nausea Only       Outpatient Medications Marked as Taking for the 2/23/23 encounter (Office Visit) with Nyla Reeves MD   Medication Sig Dispense Refill    albuterol sulfate (PROAIR RESPICLICK) 90 mcg/actuation inhaler Inhale 2 puffs into the lungs once daily.      lisdexamfetamine (VYVANSE) 40 MG Cap Take 40 mg by mouth once daily.         Social History     Socioeconomic History    Marital status: Single   Tobacco Use    Smoking status: Never      Passive exposure: Never    Smokeless tobacco: Never   Substance and Sexual Activity    Alcohol use: Yes    Drug use: No    Sexual activity: Not Currently     Birth control/protection: None        Family History   Problem Relation Age of Onset    Hypertension Mother     Heart disease Mother     Diabetes Father     Stroke Brother         Subjective:       See HPI for details    Constitutional: Denies Change in appetite. Denies Chills. Denies Fever. Denies Night sweats.  Eye: Denies Blurred vision. Denies Discharge. Denies Eye pain.  ENT: Denies Decreased hearing. Denies Sore throat. Denies Swollen glands.  Respiratory: Denies Cough. Denies Shortness of breath. Denies Shortness of breath with exertion. Denies Wheezing.  Cardiovascular: Denies Chest pain at rest. Denies Chest pain with exertion. Denies Irregular heartbeat. Denies Palpitations.  Gastrointestinal: Denies Abdominal pain. Denies Diarrhea. Denies Nausea. Denies Vomiting. Denies Hematemesis or Hematochezia.  Genitourinary: Denies Dysuria. Denies Urinary frequency. Denies Urinary urgency. Denies Blood in urine.  Endocrine: Denies Cold intolerance. Denies Excessive thirst. Denies Heat intolerance. Denies Weight loss. Denies Weight gain.  Musculoskeletal: Denies Painful joints. Denies Weakness.  Integumentary: Denies Rash. Denies Itching. Denies Dry skin.  Neurologic: Denies Dizziness. Denies Fainting. Denies Headache.  Psychiatric: Denies Depression. Denies Anxiety. Denies Suicidal/Homicidal ideations.    All Other ROS: Negative except as stated in HPI.   Answers submitted by the patient for this visit:  Review of Systems Questionnaire (Submitted on 2/21/2023)  activity change: No  unexpected weight change: No  neck pain: No  hearing loss: No  rhinorrhea: No  trouble swallowing: No  eye discharge: No  visual disturbance: No  chest tightness: No  wheezing: No  chest pain: No  palpitations: No  blood in stool: No  constipation: No  vomiting: No  diarrhea:  No  polydipsia: No  polyuria: No  difficulty urinating: No  hematuria: No  menstrual problem: No  dysuria: No  joint swelling: No  arthralgias: No  headaches: No  weakness: No  confusion: No  dysphoric mood: No      Objective:     Wt 77.1 kg (170 lb)   BMI 29.18 kg/m²     Physical Exam    Physical Exam: LIMITED DUE TO TELEMEDICINE RESTRICTIONS.  General: Alert and oriented, No acute distress.  Head: Normocephalic, Atraumatic.  Eye: Sclera non-icteric.  Neck/Thyroid:  Full range of motion.  Respiratory: Non-labored respirations, Symmetrical chest wall expansion.  Musculoskeletal: Normal range of motion.  Integumentary: Warm, Dry, Intact, No visible suspicious lesions or rashes. No diaphoresis.   Neurologic: No focal deficits  Psychiatric: Normal interaction, Coherent speech, Euthymic mood, Appropriate affect       Assessment:       ICD-10-CM ICD-9-CM   1. Generalized anxiety disorder  F41.1 300.02        Plan:     Problem List Items Addressed This Visit    None  Visit Diagnoses       Generalized anxiety disorder    -  Primary         1. Generalized anxiety disorder  - Letter written for patient as requested, please see scanned copy in chart for details. Continue current treatment plan and relaxation techniques. Will titrate medication as needed/tolerated. Notify M.D. or ER if symptoms persist or worsen, SI/HI, temp greater than 100.4, or any acute illness.    Start   /  Continue   Practice deep breathing or abdominal breathing exercises when anxiety occurs.  Exercise daily. Get sunlight daily.  Avoid caffeine, alcohol and stimulants.  Practice positive phrases and repeat throughout the day, yoga, lavender scents or Chamomile tea will help anxiety.  Set healthy boundaries, avoid people and conversations that increase stress.  Reports any symptoms of suicidal or homicidal ideations immediately, if clinic is closed go to nearest emergency room.     Eleanor was seen today for follow-up.    Diagnoses and all orders for this  visit:    Generalized anxiety disorder          Medication List with Changes/Refills   Current Medications    ALBUTEROL SULFATE (PROAIR RESPICLICK) 90 MCG/ACTUATION INHALER    Inhale 2 puffs into the lungs once daily.       Start Date: 12/21/2021End Date: --    DIAZEPAM (VALIUM) 10 MG TAB    Take 1 tablet (10 mg total) by mouth every 24 hours as needed (Anxiety).       Start Date: 11/22/2022End Date: 12/22/2022    ESCITALOPRAM OXALATE (LEXAPRO) 10 MG TABLET    Take 1 tablet (10 mg total) by mouth once daily.       Start Date: 11/22/2022End Date: 2/20/2023    LISDEXAMFETAMINE (VYVANSE) 40 MG CAP    Take 40 mg by mouth once daily.       Start Date: 12/21/2021End Date: --    VALACYCLOVIR (VALTREX) 1000 MG TABLET    Take 1 tablet (1,000 mg total) by mouth 2 (two) times daily.       Start Date: 11/22/2022End Date: 2/20/2023    ZOLPIDEM (AMBIEN) 5 MG TAB    Take 1 tablet (5 mg total) by mouth nightly as needed (Insomnia).       Start Date: 11/22/2022End Date: 12/22/2022          Follow up in about 6 weeks (around 4/3/2023) for Wellness.      Video Time Documentation:  Spent 10 minutes with patient face to face discussed health concerns. More than 50% of this time was spent in counseling and coordination of care.

## 2023-02-28 ENCOUNTER — TELEPHONE (OUTPATIENT)
Dept: FAMILY MEDICINE | Facility: CLINIC | Age: 38
End: 2023-02-28

## 2023-02-28 NOTE — TELEPHONE ENCOUNTER
----- Message from Mane Sandoval sent at 2/24/2023 11:15 AM CST -----  .Type:  Needs Medical Advice    Who Called: [facility]    Symptoms (please be specific):    How long has patient had these symptoms:    Pharmacy name and phone #:    Would the patient rather a call back or a response via [facility]?   Best Call Back Number: [phone], Secured voice message  Additional Information: She would like a call back on the secured voicemail re: the paperwork for a emotionally supported animal that the patient needs.

## 2023-04-17 ENCOUNTER — PATIENT MESSAGE (OUTPATIENT)
Dept: ADMINISTRATIVE | Facility: HOSPITAL | Age: 38
End: 2023-04-17

## 2025-03-26 ENCOUNTER — OFFICE VISIT (OUTPATIENT)
Dept: FAMILY MEDICINE | Facility: CLINIC | Age: 40
End: 2025-03-26

## 2025-03-26 DIAGNOSIS — J45.990 EXERCISE-INDUCED ASTHMA: ICD-10-CM

## 2025-03-26 DIAGNOSIS — B00.1 COLD SORE: ICD-10-CM

## 2025-03-26 DIAGNOSIS — F41.1 GENERALIZED ANXIETY DISORDER: Primary | ICD-10-CM

## 2025-03-26 DIAGNOSIS — G47.09 OTHER INSOMNIA: ICD-10-CM

## 2025-03-26 RX ORDER — DIAZEPAM 10 MG/1
10 TABLET ORAL
Qty: 30 TABLET | Refills: 2 | Status: SHIPPED | OUTPATIENT
Start: 2025-03-26 | End: 2025-06-24

## 2025-03-26 RX ORDER — VALACYCLOVIR HYDROCHLORIDE 1 G/1
2000 TABLET, FILM COATED ORAL EVERY 12 HOURS
Qty: 4 TABLET | Refills: 5 | Status: SHIPPED | OUTPATIENT
Start: 2025-03-26 | End: 2025-03-27

## 2025-03-26 RX ORDER — ZOLPIDEM TARTRATE 10 MG/1
10 TABLET ORAL NIGHTLY PRN
Qty: 30 TABLET | Refills: 5 | Status: SHIPPED | OUTPATIENT
Start: 2025-03-26 | End: 2025-09-24

## 2025-03-26 NOTE — PROGRESS NOTES
Patient ID: [id number]     Chief Complaint: Medication Refill        HPI:     This is a telemedicine note. Patient was treated using telemedicine, real time audio and video, according to [facility] protocols. Nyla MARAVILLA M.D. , conducted the visit from the [facility]. The patient participated in the visit at a non-[facility] location selected by the patient, identified below. I am licensed in the state where the patient stated they are located. The patient stated that they understood and accepted the privacy and security risks to their information at their location. This visit is not recorded.    Patient was located at the patient's home.     Eleanor Varela is a 39 y.o. female here today for a telemedicine visit.    - She has severe anxiety that is controlled with Valium p.r.n., no side effects, she needs Rx refill today, she also reports that her anxiety is improved with her service dog.   - Exercise- induced asthma is controlled with albuterol p.r.n. Patient needs refill on albuterol today  Cold sores are controlled with Valtrex p.r.n., no side effects.  She needs refill today.    - Here for follow-up insomnia.  Reports were to 5 hours of sleep with Ambien 5 mg, no side effects, would like to proceed with increased dose of Ambien.  She reports difficulty falling and staying asleep.  He denies depression, suicidal/homicidal ideation, or auditory/visual hallucinations. Denies snoring or witnessed apnea.   - Patient is without any other complaints today.        -------------------------------------    ADHD (attention deficit hyperactivity disorder)    Generalized anxiety disorder    Insomnia    Obesity, unspecified    Vitamin D deficiency        Past Surgical History:   Procedure Laterality Date    WISDOM TOOTH EXTRACTION         Review of patient's allergies indicates:   Allergen Reactions    Aspirin-caffeine      Other reaction(s): Rash    Hydrocodone Hives and Nausea Only     Hydrocodone-acetaminophen     Ibuprofen Hives and Nausea Only       Outpatient Medications Marked as Taking for the 3/26/25 encounter (Office Visit) with Nyla Reeves MD   Medication Sig Dispense Refill    lisdexamfetamine (VYVANSE) 40 MG Cap Take 40 mg by mouth once daily.      [DISCONTINUED] albuterol sulfate (PROAIR RESPICLICK) 90 mcg/actuation inhaler Inhale 2 puffs into the lungs once daily.         Social History[1]     Family History   Problem Relation Name Age of Onset    Hypertension Mother Rj Varela     Heart disease Mother Rj Varela     Diabetes Father Anca Varela     Stroke Brother Bertrand Varela         Subjective:       See HPI for details    Constitutional: Denies Change in appetite. Denies Chills. Denies Fever. Denies Night sweats.  Eye: Denies Blurred vision. Denies Discharge. Denies Eye pain.  ENT: Denies Decreased hearing. Denies Sore throat. Denies Swollen glands.  Respiratory: Denies Cough. Denies Shortness of breath. Denies Shortness of breath with exertion. Denies Wheezing.  Cardiovascular: Denies Chest pain at rest. Denies Chest pain with exertion. Denies Irregular heartbeat. Denies Palpitations.  Gastrointestinal: Denies Abdominal pain. Denies Diarrhea. Denies Nausea. Denies Vomiting. Denies Hematemesis or Hematochezia.  Genitourinary: Denies Dysuria. Denies Urinary frequency. Denies Urinary urgency. Denies Blood in urine.  Endocrine: Denies Cold intolerance. Denies Excessive thirst. Denies Heat intolerance. Denies Weight loss. Denies Weight gain.  Musculoskeletal: Denies Painful joints. Denies Weakness.  Integumentary: Denies Rash. Denies Itching. Denies Dry skin.  Neurologic: Denies Dizziness. Denies Fainting. Denies Headache.  Psychiatric: As per HPI. Denies Depression. Reports Anxiety. Denies Suicidal/Homicidal ideations.    All Other ROS: Negative except as stated in HPI.   Answers submitted by the patient for this visit:  Review of Systems  Questionnaire (Submitted on 3/25/2025)  activity change: No  unexpected weight change: No  neck pain: No  hearing loss: No  rhinorrhea: No  trouble swallowing: No  eye discharge: No  visual disturbance: No  chest tightness: No  wheezing: No  chest pain: No  palpitations: No  blood in stool: No  constipation: No  vomiting: No  diarrhea: No  polydipsia: No  polyuria: No  difficulty urinating: No  hematuria: No  menstrual problem: No  dysuria: No  joint swelling: No  arthralgias: No  headaches: No  weakness: No  confusion: No  dysphoric mood: No      Objective:     LMP 03/05/2025 (Exact Date)     Physical Exam    Physical Exam: LIMITED DUE TO TELEMEDICINE RESTRICTIONS.  General: Alert and oriented, No acute distress.  Head: Normocephalic, Atraumatic.  Eye: Sclera non-icteric.  Neck/Thyroid:  Full range of motion.  Respiratory: Non-labored respirations, Symmetrical chest wall expansion.  Musculoskeletal: Normal range of motion.  Integumentary: Warm, Dry, Intact, No visible suspicious lesions or rashes. No diaphoresis.   Neurologic: No focal deficits  Psychiatric: Normal interaction, Coherent speech, Euthymic mood, Appropriate affect       Assessment:       ICD-10-CM ICD-9-CM   1. Generalized anxiety disorder  F41.1 300.02   2. Other insomnia  G47.09 780.52   3. Cold sore  B00.1 054.9   4. Exercise-induced asthma  J45.990 493.81        Plan:     Problem List Items Addressed This Visit          Psychiatric    Generalized anxiety disorder - Primary    Relevant Medications    diazePAM (VALIUM) 10 MG Tab    Other Relevant Orders    [facility] E-VISIT       Pulmonary    Exercise-induced asthma    Relevant Medications    albuterol sulfate (PROAIR RESPICLICK) 90 mcg/actuation inhaler       ID    Cold sore    Relevant Medications    valACYclovir (VALTREX) 1000 MG tablet       Other    Other insomnia    Relevant Medications    zolpidem (AMBIEN) 10 mg Tab    1. Generalized anxiety disorder  - diazePAM (VALIUM) 10 MG Tab; Take 1 tablet  (10 mg total) by mouth every 24 hours as needed (Anxiety).  Dispense: 30 tablet; Refill: 2  - [facility] E-VISIT in 30 months  - Rx Valium refilled today.  reviewed today. Continue current treatment plan and relaxation techniques. Will titrate medication as needed/tolerated. Notify M.D. or ER if symptoms persist or worsen, SI/HI, temp greater than 100.4, or any acute illness.    Start   /  Continue   Practice deep breathing or abdominal breathing exercises when anxiety occurs.  Exercise daily. Get sunlight daily.  Avoid caffeine, alcohol and stimulants.  Practice positive phrases and repeat throughout the day, yoga, lavender scents or Chamomile tea will help anxiety.  Set healthy boundaries, avoid people and conversations that increase stress.  Reports any symptoms of suicidal or homicidal ideations immediately, if clinic is closed go to nearest emergency room.    2. Other insomnia  - Rx trial of increased dose of zolpidem (AMBIEN) to 10 mg Tab; Take 1 tablet (10 mg total) by mouth nightly as needed (insomnia).  Dispense: 30 tablet; Refill: 5; Continue relaxation techniques/proper sleep hygiene encouraged. Will titrate medication as needed/tolerated. Notify M.D. or ER if symptoms persist or worsen, SI/HI, temp greater than 100.4, or any acute illness.      3. Cold sore  - Rx valACYclovir (VALTREX) 1000 MG tablet; Take 2 tablets (2,000 mg total) by mouth every 12 (twelve) hours. for 1 day  Dispense: 4 tablet; Refill: 5 refilled today    4. Exercise-induced asthma  - Rx albuterol sulfate (PROAIR RESPICLICK) 90 mcg/actuation inhaler; 2 puffs inhaled 15 minutes prior to exercise  Dispense: 1 each; Refill: 5 refilled today        Eleanor was seen today for medication refill.    Diagnoses and all orders for this visit:    Generalized anxiety disorder  -     diazePAM (VALIUM) 10 MG Tab; Take 1 tablet (10 mg total) by mouth every 24 hours as needed (Anxiety).  -     [facility] E-VISIT    Other insomnia  -     zolpidem (AMBIEN) 10 mg Tab;  Take 1 tablet (10 mg total) by mouth nightly as needed (insomnia).    Cold sore  -     valACYclovir (VALTREX) 1000 MG tablet; Take 2 tablets (2,000 mg total) by mouth every 12 (twelve) hours. for 1 day    Exercise-induced asthma  -     albuterol sulfate (PROAIR RESPICLICK) 90 mcg/actuation inhaler; 2 puffs inhaled 15 minutes prior to exercise          Medication List with Changes/Refills   New Medications    ZOLPIDEM (AMBIEN) 10 MG TAB    Take 1 tablet (10 mg total) by mouth nightly as needed (insomnia).       Start Date: 3/26/2025 End Date: 9/24/2025   Current Medications    LISDEXAMFETAMINE (VYVANSE) 40 MG CAP    Take 40 mg by mouth once daily.       Start Date: 12/21/2021End Date: --   Changed and/or Refilled Medications    Modified Medication Previous Medication    ALBUTEROL SULFATE (PROAIR RESPICLICK) 90 MCG/ACTUATION INHALER albuterol sulfate (PROAIR RESPICLICK) 90 mcg/actuation inhaler       2 puffs inhaled 15 minutes prior to exercise    Inhale 2 puffs into the lungs once daily.       Start Date: 3/26/2025 End Date: --    Start Date: 12/21/2021End Date: 3/26/2025    DIAZEPAM (VALIUM) 10 MG TAB diazePAM (VALIUM) 10 MG Tab       Take 1 tablet (10 mg total) by mouth every 24 hours as needed (Anxiety).    Take 1 tablet (10 mg total) by mouth every 24 hours as needed (Anxiety).       Start Date: 3/26/2025 End Date: 6/24/2025    Start Date: 11/22/2022End Date: 3/26/2025    VALACYCLOVIR (VALTREX) 1000 MG TABLET valACYclovir (VALTREX) 1000 MG tablet       Take 2 tablets (2,000 mg total) by mouth every 12 (twelve) hours. for 1 day    Take 1 tablet (1,000 mg total) by mouth 2 (two) times daily.       Start Date: 3/26/2025 End Date: 3/27/2025    Start Date: 11/22/2022End Date: 3/26/2025   Discontinued Medications    ESCITALOPRAM OXALATE (LEXAPRO) 10 MG TABLET    Take 1 tablet (10 mg total) by mouth once daily.       Start Date: 11/22/2022End Date: 3/26/2025    ZOLPIDEM (AMBIEN) 5 MG TAB    Take 1 tablet (5 mg total) by  mouth nightly as needed (Insomnia).       Start Date: 11/22/2022End Date: 3/26/2025          Follow up in about 3 months (around 6/26/2025) for Anxiety followup, Evisit Followup.      Audio/Video Time Documentation:  Spent 14 minutes for telemedicine visit with successful audio/visual connection. MDM was used for billing.          [1]   Social History  Socioeconomic History    Marital status: Single   Tobacco Use    Smoking status: Never     Passive exposure: Never    Smokeless tobacco: Never   Substance and Sexual Activity    Alcohol use: Yes    Drug use: No    Sexual activity: Not Currently     Birth control/protection: None     Social Drivers of Health     Financial Resource Strain: Low Risk  (3/25/2025)    Overall Financial Resource Strain (CARDIA)     Difficulty of Paying Living Expenses: Not very hard   Food Insecurity: No Food Insecurity (3/25/2025)    Hunger Vital Sign     Worried About Running Out of Food in the Last Year: Never true     Ran Out of Food in the Last Year: Never true   Transportation Needs: No Transportation Needs (3/25/2025)    PRAPARE - Transportation     Lack of Transportation (Medical): No     Lack of Transportation (Non-Medical): No   Physical Activity: Sufficiently Active (3/25/2025)    Exercise Vital Sign     Days of Exercise per Week: 4 days     Minutes of Exercise per Session: 60 min   Stress: Stress Concern Present (3/25/2025)    [employer] - Occupational Stress Questionnaire     Feeling of Stress : To some extent   Housing Stability: Low Risk  (3/25/2025)    Housing Stability Vital Sign     Unable to Pay for Housing in the Last Year: No     Number of Times Moved in the Last Year: 0     Homeless in the Last Year: No